# Patient Record
Sex: FEMALE | URBAN - METROPOLITAN AREA
[De-identification: names, ages, dates, MRNs, and addresses within clinical notes are randomized per-mention and may not be internally consistent; named-entity substitution may affect disease eponyms.]

---

## 2017-03-30 PROBLEM — 271737000 ANEMIA: Status: ACTIVE | Noted: 2017-03-30

## 2017-03-30 PROBLEM — 195295006 RAYNAUD'S DISEASE: Status: ACTIVE | Noted: 2017-03-30

## 2017-03-30 PROBLEM — 80313002 PALPITATIONS: Status: ACTIVE | Noted: 2017-03-30

## 2021-08-28 ENCOUNTER — TELEPHONE ENCOUNTER (OUTPATIENT)
Dept: URBAN - METROPOLITAN AREA CLINIC 13 | Facility: CLINIC | Age: 61
End: 2021-08-28

## 2021-08-29 ENCOUNTER — TELEPHONE ENCOUNTER (OUTPATIENT)
Dept: URBAN - METROPOLITAN AREA CLINIC 13 | Facility: CLINIC | Age: 61
End: 2021-08-29

## 2021-08-29 RX ORDER — ASPIRIN 81 MG/1
TABLET, COATED ORAL
Status: ACTIVE | COMMUNITY

## 2021-08-29 RX ORDER — ALPRAZOLAM 0.25 MG
TABLET ORAL
Status: ACTIVE | COMMUNITY

## 2022-11-16 ENCOUNTER — WEB ENCOUNTER (OUTPATIENT)
Dept: URBAN - METROPOLITAN AREA CLINIC 44 | Facility: CLINIC | Age: 62
End: 2022-11-16

## 2022-11-29 ENCOUNTER — WEB ENCOUNTER (OUTPATIENT)
Dept: URBAN - METROPOLITAN AREA SURGERY CENTER 28 | Facility: SURGERY CENTER | Age: 62
End: 2022-11-29

## 2022-12-05 ENCOUNTER — CLAIMS CREATED FROM THE CLAIM WINDOW (OUTPATIENT)
Dept: URBAN - METROPOLITAN AREA CLINIC 4 | Facility: CLINIC | Age: 62
End: 2022-12-05
Payer: COMMERCIAL

## 2022-12-05 ENCOUNTER — OFFICE VISIT (OUTPATIENT)
Dept: URBAN - METROPOLITAN AREA SURGERY CENTER 28 | Facility: SURGERY CENTER | Age: 62
End: 2022-12-05
Payer: COMMERCIAL

## 2022-12-05 DIAGNOSIS — Z80.0 BROTHER AT YOUNG AGE FAMILY HISTORY OF COLON CANCER: ICD-10-CM

## 2022-12-05 DIAGNOSIS — K63.89 OTHER SPECIFIED DISEASES OF INTESTINE: ICD-10-CM

## 2022-12-05 DIAGNOSIS — K63.5 BENIGN COLON POLYP: ICD-10-CM

## 2022-12-05 PROCEDURE — 45385 COLONOSCOPY W/LESION REMOVAL: CPT | Performed by: INTERNAL MEDICINE

## 2022-12-05 PROCEDURE — 88305 TISSUE EXAM BY PATHOLOGIST: CPT | Performed by: PATHOLOGY

## 2022-12-05 PROCEDURE — G8907 PT DOC NO EVENTS ON DISCHARG: HCPCS | Performed by: INTERNAL MEDICINE

## 2022-12-19 ENCOUNTER — TELEPHONE ENCOUNTER (OUTPATIENT)
Dept: URBAN - METROPOLITAN AREA CLINIC 44 | Facility: CLINIC | Age: 62
End: 2022-12-19

## 2022-12-29 ENCOUNTER — TELEPHONE ENCOUNTER (OUTPATIENT)
Dept: URBAN - METROPOLITAN AREA CLINIC 46 | Facility: CLINIC | Age: 62
End: 2022-12-29

## 2024-07-11 ENCOUNTER — DASHBOARD ENCOUNTERS (OUTPATIENT)
Age: 64
End: 2024-07-11

## 2024-07-11 ENCOUNTER — OFFICE VISIT (OUTPATIENT)
Dept: URBAN - METROPOLITAN AREA CLINIC 46 | Facility: CLINIC | Age: 64
End: 2024-07-11
Payer: COMMERCIAL

## 2024-07-11 VITALS
HEART RATE: 69 BPM | BODY MASS INDEX: 19.48 KG/M2 | WEIGHT: 121.2 LBS | SYSTOLIC BLOOD PRESSURE: 145 MMHG | HEIGHT: 66 IN | DIASTOLIC BLOOD PRESSURE: 86 MMHG | TEMPERATURE: 97.8 F

## 2024-07-11 DIAGNOSIS — K21.9 GASTROESOPHAGEAL REFLUX DISEASE, UNSPECIFIED WHETHER ESOPHAGITIS PRESENT: ICD-10-CM

## 2024-07-11 DIAGNOSIS — Z80.0 FAMILY HX OF COLON CANCER: ICD-10-CM

## 2024-07-11 DIAGNOSIS — R19.4 CHANGE IN BOWEL HABITS: ICD-10-CM

## 2024-07-11 PROBLEM — 235595009: Status: ACTIVE | Noted: 2024-07-11

## 2024-07-11 PROCEDURE — 99214 OFFICE O/P EST MOD 30 MIN: CPT | Performed by: INTERNAL MEDICINE

## 2024-07-11 RX ORDER — ALPRAZOLAM 0.25 MG
1/2 TABLET TABLET ORAL TWICE A DAY
Status: ACTIVE | COMMUNITY

## 2024-07-11 RX ORDER — DILTIAZEM HYDROCHLORIDE 180 MG/1
1 CAPSULE CAPSULE, EXTENDED RELEASE ORAL ONCE A DAY
Status: ACTIVE | COMMUNITY

## 2024-07-11 RX ORDER — FAMOTIDINE 20 MG/1
1 TABLET AT BEDTIME AS NEEDED TABLET, FILM COATED ORAL ONCE A DAY
Status: ACTIVE | COMMUNITY

## 2024-07-11 NOTE — HPI-TODAY'S VISIT:
64-year-old female presents for evaluation of frequent bowel movements x 9 months. She reports she was previously constipated on PO iron, but now is having episodes of frequent BMs approx 2 days per week. On these days, she reports up to 7 small sticky/puddy-like BMs daily, with sensation of incomplete emptying. Denies abd pain, melena/rectal bleeding, weight loss, fever. She cannot identify a dietary trigger for episodes, but does state she has been stressed lately. In between episodes, she is having BM every other day, with normal stools. She takes prebiotic/probiotic, with slight improvement. Patient has not tried OTC stool softeners or laxatives. Last colonoscopy 12/5/2022 with nonbleeding internal hemorrhoids, benign mucosal polyp. Her oldest brother was dx'd with CRC at 58 yo. Recommended repeat colon in 5 years. Her mother has hx UC.  She also has hx GERD, and reports increased belching x 2 years. Also has had chronic cough x 3 months following URI in April. Symptoms are worst at night. Denies heartburn/indigestion, dysphagia, N/V. She has been rx'd pepcid 20 mg at bedtime with complete improvement in symptoms. EGD 4/3/17 for heartburn symptoms with single polyp and gastric body and prepyloric region.  No H pylori, dysplasia or malignancy.  Patient reports basic labs were normal in March.

## 2024-07-18 ENCOUNTER — TELEPHONE ENCOUNTER (OUTPATIENT)
Dept: URBAN - METROPOLITAN AREA CLINIC 46 | Facility: CLINIC | Age: 64
End: 2024-07-18

## 2024-11-07 ENCOUNTER — OFFICE VISIT (OUTPATIENT)
Dept: URBAN - METROPOLITAN AREA CLINIC 46 | Facility: CLINIC | Age: 64
End: 2024-11-07

## 2024-11-14 ENCOUNTER — OFFICE VISIT (OUTPATIENT)
Dept: URBAN - METROPOLITAN AREA CLINIC 46 | Facility: CLINIC | Age: 64
End: 2024-11-14